# Patient Record
Sex: MALE | Race: WHITE | NOT HISPANIC OR LATINO | Employment: PART TIME | URBAN - METROPOLITAN AREA
[De-identification: names, ages, dates, MRNs, and addresses within clinical notes are randomized per-mention and may not be internally consistent; named-entity substitution may affect disease eponyms.]

---

## 2023-10-03 ENCOUNTER — APPOINTMENT (OUTPATIENT)
Dept: GENERAL RADIOLOGY | Facility: CLINIC | Age: 43
End: 2023-10-03
Attending: EMERGENCY MEDICINE
Payer: COMMERCIAL

## 2023-10-03 ENCOUNTER — HOSPITAL ENCOUNTER (EMERGENCY)
Facility: CLINIC | Age: 43
Discharge: HOME | End: 2023-10-03
Attending: EMERGENCY MEDICINE | Admitting: EMERGENCY MEDICINE
Payer: COMMERCIAL

## 2023-10-03 VITALS
HEART RATE: 88 BPM | WEIGHT: 260 LBS | BODY MASS INDEX: 37.22 KG/M2 | RESPIRATION RATE: 21 BRPM | HEIGHT: 70 IN | SYSTOLIC BLOOD PRESSURE: 134 MMHG | TEMPERATURE: 98 F | DIASTOLIC BLOOD PRESSURE: 78 MMHG | OXYGEN SATURATION: 97 %

## 2023-10-03 DIAGNOSIS — R09.1 PLEURISY: ICD-10-CM

## 2023-10-03 LAB
FLUAV RNA SPEC QL NAA+PROBE: NEGATIVE
FLUBV RNA RESP QL NAA+PROBE: NEGATIVE
RSV RNA SPEC NAA+PROBE: NEGATIVE
SARS-COV-2 RNA RESP QL NAA+PROBE: NEGATIVE

## 2023-10-03 PROCEDURE — 87637 SARSCOV2&INF A&B&RSV AMP PRB: CPT | Performed by: EMERGENCY MEDICINE

## 2023-10-03 PROCEDURE — 250N000012 HC RX MED GY IP 250 OP 636 PS 637: Performed by: EMERGENCY MEDICINE

## 2023-10-03 PROCEDURE — 99284 EMERGENCY DEPT VISIT MOD MDM: CPT | Mod: 25

## 2023-10-03 PROCEDURE — 71046 X-RAY EXAM CHEST 2 VIEWS: CPT

## 2023-10-03 PROCEDURE — 250N000013 HC RX MED GY IP 250 OP 250 PS 637: Performed by: EMERGENCY MEDICINE

## 2023-10-03 RX ORDER — PREDNISONE 20 MG/1
40 TABLET ORAL ONCE
Status: COMPLETED | OUTPATIENT
Start: 2023-10-03 | End: 2023-10-03

## 2023-10-03 RX ORDER — PREDNISONE 20 MG/1
40 TABLET ORAL DAILY
Qty: 10 TABLET | Refills: 0 | Status: SHIPPED | OUTPATIENT
Start: 2023-10-04 | End: 2023-10-09

## 2023-10-03 RX ORDER — IBUPROFEN 600 MG/1
600 TABLET, FILM COATED ORAL ONCE
Status: COMPLETED | OUTPATIENT
Start: 2023-10-03 | End: 2023-10-03

## 2023-10-03 RX ADMIN — IBUPROFEN 600 MG: 600 TABLET ORAL at 18:32

## 2023-10-03 RX ADMIN — PREDNISONE 40 MG: 20 TABLET ORAL at 20:50

## 2023-10-03 ASSESSMENT — ACTIVITIES OF DAILY LIVING (ADL): ADLS_ACUITY_SCORE: 35

## 2023-10-03 NOTE — ED TRIAGE NOTES
Pt has been coughing for 3 weeks   Pt states he pulled a muscle in his shouler and back recently

## 2023-10-04 NOTE — DISCHARGE INSTRUCTIONS
Take prednisone 40 mg daily for the next 5 days.  You can take extra strength Tylenol as needed for pain.  This inflammation is likely caused by a recent viral infection that you have had in and around your lungs.  If you develop increasing pain, shortness of breath, fevers, chills, or sweats, please return to your primary care physician or the emergency department.  If you develop any coughing up of blood, also return.    You have no evidence of significant bacterial pneumonia at this time.

## 2023-10-04 NOTE — ED PROVIDER NOTES
"  History     Chief Complaint:  Back Pain and Shoulder Pain       HPI   Howie Grigsby is a 43 year old male who presents to the ED with a persistent cough for 3 weeks. Patient lives at home with his wife and children, and reports that they all have a less severe cough similar to his. He adds that yesterday, he felt like he \"pulled something\" in his right-medial back, which is still present, but only after he coughs or moves the wrong way. He also notes that he is experiencing some pleuritic pain, which is also only present when he coughs. No history of DVT/PE. No recent long distance travel of any kind. He has tested for COVID once a week over the past 3 weeks, all of which came back negative.     He also was recently seen by his PCP via virtual visit on 9/28 and started on a Prednisone burst (40 mg daily) for pneumonia, which he finished yesterday morning. He was also placed on Azithromycin, which he took for 4 days.     Independent Historian:     Review of External Notes:    Allergies:  No Known Allergies     Medications:    The patient is not currently taking any prescribed medications.    Past Medical and Surgical History:     No pertinent history.     Social History:  PCP: No primary care provider on file.     Physical Exam   Patient Vitals for the past 24 hrs:   BP Temp Temp src Pulse Resp SpO2 Height Weight   10/03/23 2049 -- -- -- -- -- 97 % -- --   10/03/23 2048 134/78 -- -- 88 -- -- -- --   10/03/23 1828 127/89 98  F (36.7  C) Oral 95 21 99 % 1.778 m (5' 10\") 117.9 kg (260 lb)        General: Resting comfortably on the gurney.  The patient still has a mild dry cough.  Head:  The scalp, face, and head appear normal  Eyes:  The pupils are equal, round, and reactive to light    There is no nystagmus    Extraocular muscles are intact    Conjunctivae and sclerae are normal  ENT:    The nose is normal    Pinnae are normal    The oropharynx is normal  Neck:  Normal range of motion    There is no rigidity " noted  CV:  Regular rate  Normal underlying rhythm     Normal S1/S2    No pathological murmur detected  Resp:  Lungs are clear    There is no tachypnea    Non-labored    No rales    No wheezing   GI:  Abdomen is soft, there is no rigidity    No distension/tympani    No rebound tenderness     Non-surgical without peritoneal features at this time  MS:  Normal muscular tone    Symmetric motor strength    No major joint effusions  Skin:  No rash or acute skin lesions noted  Neuro:  Normal and fluent speech    No motor deficits  Psych: Awake. Alert.  Normal affect.      Emergency Department Course      Imaging:  Chest XR,  PA & LAT   Final Result   IMPRESSION: Negative chest. Lungs clear. No pneumothorax or pleural effusion. No fractures evident.         Report per radiology    Laboratory:  Labs Ordered and Resulted from Time of ED Arrival to Time of ED Departure   INFLUENZA A/B, RSV, & SARS-COV2 PCR - Normal       Result Value    Influenza A PCR Negative      Influenza B PCR Negative      RSV PCR Negative      SARS CoV2 PCR Negative       Emergency Department Course & Assessments:    Interventions/ED Medications:  Medications   ibuprofen (ADVIL/MOTRIN) tablet 600 mg (600 mg Oral $Given 10/3/23 1832)   predniSONE (DELTASONE) tablet 40 mg (40 mg Oral $Given 10/3/23 2050)        Independent Interpretation of Radiology Studies:  I independently reviewed the patient's chest x-ray from today's visit. I do not appreciate any pneumonia or pneumothorax.     Assessments/Consultations/Discussion of Management:  ED Course as of 10/03/23 2209   Tue Oct 03, 2023   2026 Dr. Moss' Eval     Disposition:  Discharged.     Impression & Plan      Medical Decision Making:  This patient presents to the emergency department with a several week history of cough, several other family members have been affected.  The patient has repeatedly tested for COVID which is negative.  We rechecked him today and his COVID influenza and RSV testing are  negative.  Patient has already been on azithromycin empirically which covers many pathogens associated with community-acquired pneumonia and atypical pneumonia.  His clinical exam and chest x-ray are within normal limits.  The patient does have a mild pleuritic irritation with movement deep breath and cough to the right posterior chest.  He has no risk factors for pulmonary embolism.    PERC Criteria (PERC Negative) are met:    Is the patient older than 49: No  Is the HR >99: No  Room air sats <95%: No  Prior DVT or PE: No  Recent trauma or surgery (last 4 weeks): No  Hemoptysis: No  Exogenous estrogen: No  Clinical signs of DVT (unilateral leg swelling): No    Low risk patients who are PERC negative have a probability of PE of less than 2%.    The patient's pain has a respire aphasic and pleuritic component to it most likely consistent with pleuritic irritation from a recent viral infection.  The patient will be continued on prednisone for several more days, interestingly the symptoms started after the patient's burst of prednisone ended.  Additional empiric antibiotics are not warranted at this time.  Patient was advised what symptoms should prompt return to the emergency department.  There is no clinical evidence of DVT or PE.  There is a broad differential diagnosis to pleuritic chest pain but the most likely etiology in this setting with this presentation is pleuritic irritation.  There is no features consistent with lobar pneumonia.    Diagnosis:    ICD-10-CM    1. Pleurisy  R09.1            Discharge Medications:  Discharge Medication List as of 10/3/2023  8:46 PM        START taking these medications    Details   predniSONE (DELTASONE) 20 MG tablet Take 2 tablets (40 mg) by mouth daily for 5 days, Disp-10 tablet, R-0, E-Prescribe            Scribe Disclosure:  ANGELA CIFUENTES, am serving as a scribe at 8:35 PM on 10/3/2023 to document services personally performed by Dylon Vitale MD based on my  observations and the provider's statements to me.     10/3/2023   Dylon Vitale MD Rock, Michael P, MD  10/03/23 7193